# Patient Record
Sex: FEMALE | Race: WHITE | NOT HISPANIC OR LATINO | URBAN - METROPOLITAN AREA
[De-identification: names, ages, dates, MRNs, and addresses within clinical notes are randomized per-mention and may not be internally consistent; named-entity substitution may affect disease eponyms.]

---

## 2018-09-04 NOTE — PATIENT DISCUSSION
Discussed condition and exacerbating conditions/situations (e.g., dry/arid environments, overhead fans, air conditioners, side effect of medications). normal (ped)...

## 2020-01-16 ENCOUNTER — IMPORTED ENCOUNTER (OUTPATIENT)
Dept: URBAN - METROPOLITAN AREA CLINIC 50 | Facility: CLINIC | Age: 70
End: 2020-01-16

## 2020-02-24 ENCOUNTER — IMPORTED ENCOUNTER (OUTPATIENT)
Dept: URBAN - METROPOLITAN AREA CLINIC 50 | Facility: CLINIC | Age: 70
End: 2020-02-24

## 2020-02-26 ENCOUNTER — IMPORTED ENCOUNTER (OUTPATIENT)
Dept: URBAN - METROPOLITAN AREA CLINIC 50 | Facility: CLINIC | Age: 70
End: 2020-02-26

## 2020-03-02 ENCOUNTER — IMPORTED ENCOUNTER (OUTPATIENT)
Dept: URBAN - METROPOLITAN AREA CLINIC 50 | Facility: CLINIC | Age: 70
End: 2020-03-02

## 2020-03-03 ENCOUNTER — IMPORTED ENCOUNTER (OUTPATIENT)
Dept: URBAN - METROPOLITAN AREA CLINIC 50 | Facility: CLINIC | Age: 70
End: 2020-03-03

## 2020-11-20 ENCOUNTER — IMPORTED ENCOUNTER (OUTPATIENT)
Dept: URBAN - METROPOLITAN AREA CLINIC 50 | Facility: CLINIC | Age: 70
End: 2020-11-20

## 2021-04-17 ASSESSMENT — VISUAL ACUITY
OD_CC: 20/20
OD_CC: J1@ 16 IN
OS_CC: J1@ 16 IN
OD_BAT: 20/50
OS_BAT: 20/50
OS_CC: 20/25-2
OS_CC: +1.25
OS_OTHER: 20/50. 20/100.
OD_OTHER: 20/50. 20/100.

## 2021-04-17 ASSESSMENT — TONOMETRY
OD_IOP_MMHG: 14
OS_IOP_MMHG: 14

## 2021-06-01 ENCOUNTER — PREPPED CHART (OUTPATIENT)
Dept: URBAN - METROPOLITAN AREA CLINIC 53 | Facility: CLINIC | Age: 71
End: 2021-06-01

## 2021-06-02 ENCOUNTER — ROUTINE EXAM (OUTPATIENT)
Dept: URBAN - METROPOLITAN AREA CLINIC 53 | Facility: CLINIC | Age: 71
End: 2021-06-02

## 2021-06-02 DIAGNOSIS — H16.223: ICD-10-CM

## 2021-06-02 DIAGNOSIS — H25.813: ICD-10-CM

## 2021-06-02 DIAGNOSIS — H35.62: ICD-10-CM

## 2021-06-02 DIAGNOSIS — H40.1131: ICD-10-CM

## 2021-06-02 DIAGNOSIS — H47.233: ICD-10-CM

## 2021-06-02 DIAGNOSIS — H53.2: ICD-10-CM

## 2021-06-02 PROCEDURE — 92014 COMPRE OPH EXAM EST PT 1/>: CPT

## 2021-06-02 ASSESSMENT — KERATOMETRY
OD_K2POWER_DIOPTERS: 44.50
OD_AXISANGLE2_DEGREES: 121
OS_K1POWER_DIOPTERS: 45.50
OD_K1POWER_DIOPTERS: 45.50
OS_AXISANGLE_DEGREES: 165
OS_AXISANGLE2_DEGREES: 75
OS_K2POWER_DIOPTERS: 44.50
OD_AXISANGLE_DEGREES: 31

## 2021-06-02 ASSESSMENT — TONOMETRY
OS_IOP_MMHG: 15
OD_IOP_MMHG: 18

## 2021-06-02 ASSESSMENT — VISUAL ACUITY
OD_PH: 20/25
OD_CC: 20/30
OS_CC: 20/30
OU_CC: J1 @ 16 INCHES

## 2021-06-02 NOTE — PATIENT DISCUSSION
VISUALLY SIGNIFICANT: Informed patient that their cataract is visually significant and that surgery is recommended to improve patient's visual acuity and/or glare symptoms. RBAs of surgery discussed and questions answered. Patient to schedule biometry measurements and surgery. Recommend Laser.

## 2021-06-02 NOTE — PATIENT DISCUSSION
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 Will do iStent Inject at time of cataract surgery. HVF/OCT to be scheduled after cataract surgery.

## 2021-06-02 NOTE — PATIENT DISCUSSION
Discussion that if cataracts are causing diplopia surgery would fit, but if cataracts are not it could be muscle or systemic related. Will know more after having surgery.

## 2021-06-15 ENCOUNTER — BIOMETRY (OUTPATIENT)
Dept: URBAN - METROPOLITAN AREA CLINIC 53 | Facility: CLINIC | Age: 71
End: 2021-06-15

## 2021-06-15 DIAGNOSIS — H25.813: ICD-10-CM

## 2021-06-15 PROCEDURE — 92136 OPHTHALMIC BIOMETRY: CPT

## 2021-06-15 ASSESSMENT — KERATOMETRY
OS_AXISANGLE2_DEGREES: 75
OS_K1POWER_DIOPTERS: 45.50
OS_AXISANGLE_DEGREES: 165
OS_K2POWER_DIOPTERS: 44.50
OD_AXISANGLE_DEGREES: 30
OD_K2POWER_DIOPTERS: 44.50
OD_AXISANGLE2_DEGREES: 120
OD_K1POWER_DIOPTERS: 45.50

## 2021-06-15 NOTE — PATIENT DISCUSSION
PRE-OPERATIVE TESTING: Biometry and IOL calculations performed. Surgeon to review and select with patient at pre-op appointment. + MIGS.

## 2021-09-01 ENCOUNTER — PRE OP - CE/IOL OD (OUTPATIENT)
Dept: URBAN - METROPOLITAN AREA CLINIC 53 | Facility: CLINIC | Age: 71
End: 2021-09-01

## 2021-09-01 DIAGNOSIS — H35.62: ICD-10-CM

## 2021-09-01 DIAGNOSIS — H25.811: ICD-10-CM

## 2021-09-01 DIAGNOSIS — H40.1111: ICD-10-CM

## 2021-09-01 PROCEDURE — 92134 CPTRZ OPH DX IMG PST SGM RTA: CPT

## 2021-09-01 PROCEDURE — PREOP PRE OP VISIT

## 2021-09-01 ASSESSMENT — KERATOMETRY
OD_AXISANGLE2_DEGREES: 120
OD_K1POWER_DIOPTERS: 45.50
OS_AXISANGLE_DEGREES: 165
OS_AXISANGLE2_DEGREES: 75
OD_K2POWER_DIOPTERS: 44.50
OD_AXISANGLE_DEGREES: 30
OS_K2POWER_DIOPTERS: 44.50
OS_K1POWER_DIOPTERS: 45.50

## 2021-09-01 ASSESSMENT — VISUAL ACUITY
OS_PH: 20/40
OS_SC: 20/50-1
OD_SC: 20/60
OD_PH: 20/40

## 2021-09-01 ASSESSMENT — TONOMETRY
OD_IOP_MMHG: 16
OS_IOP_MMHG: 15

## 2021-09-09 ENCOUNTER — SURGERY/PROCEDURE (OUTPATIENT)
Dept: URBAN - METROPOLITAN AREA SURGERY 16 | Facility: SURGERY | Age: 71
End: 2021-09-09

## 2021-09-09 ENCOUNTER — SAME DAY PO - CE/IOL (OUTPATIENT)
Dept: URBAN - METROPOLITAN AREA CLINIC 48 | Facility: CLINIC | Age: 71
End: 2021-09-09

## 2021-09-09 DIAGNOSIS — Z96.1: ICD-10-CM

## 2021-09-09 DIAGNOSIS — H25.811: ICD-10-CM

## 2021-09-09 DIAGNOSIS — Z98.41: ICD-10-CM

## 2021-09-09 DIAGNOSIS — H40.1111: ICD-10-CM

## 2021-09-09 PROCEDURE — DIUXXFEMTO EYHANCE TORIC IOL WITH FEMTO

## 2021-09-09 PROCEDURE — 66984 XCAPSL CTRC RMVL W/O ECP: CPT

## 2021-09-09 PROCEDURE — 99024 POSTOP FOLLOW-UP VISIT: CPT

## 2021-09-09 PROCEDURE — 0191T ISTENT INJECT MICROSTENT: CPT

## 2021-09-09 ASSESSMENT — TONOMETRY: OD_IOP_MMHG: 28

## 2021-09-09 ASSESSMENT — KERATOMETRY
OS_K1POWER_DIOPTERS: 45.50
OS_AXISANGLE2_DEGREES: 75
OD_K2POWER_DIOPTERS: 44.50
OD_AXISANGLE2_DEGREES: 120
OD_AXISANGLE_DEGREES: 30
OD_K1POWER_DIOPTERS: 45.50
OS_AXISANGLE2_DEGREES: 75
OD_K2POWER_DIOPTERS: 44.50
OD_AXISANGLE_DEGREES: 30
OS_K1POWER_DIOPTERS: 45.50
OD_K1POWER_DIOPTERS: 45.50
OD_AXISANGLE2_DEGREES: 120
OS_AXISANGLE_DEGREES: 165
OS_K2POWER_DIOPTERS: 44.50
OS_AXISANGLE_DEGREES: 165
OS_K2POWER_DIOPTERS: 44.50

## 2021-09-09 ASSESSMENT — VISUAL ACUITY: OD_SC: 20/70

## 2021-09-09 NOTE — PATIENT DISCUSSION
CATARACT SURGERY PLANNER - TORIC IOL/+FEMTO/MIGS: Phacoemulsification with IOL: Eye: OD|DOS: 9/9/2021|Model: Francella Hole: 21.5(ORA)|Target: PLANO|Femto: YES|Axis: 35°|MIGS: STENT INJECT|Visc: DUET|Omidria: YES|10% Phenylephrine: YES|Epi-shugarcaine: YES|Phaco Setting: STD|BSS+: NO|Trypan Blue: NO|CTR:NO|Olive Tip: +/-|Atropine: NO|Pupilloplasty: NO|Notes: HX. MILD POAG OU; POSSIBLE INTERMITTENT DIPLOPIA OU. DILATED PUPIL: 7MM. ***REQUESTS 10 AM ARRIVAL***.

## 2021-09-09 NOTE — PATIENT DISCUSSION
Long discussion on double vision. Patient understands that cataract surgery will not fix double VA if is coming from misalignment. Patient aware of possible need for prism glasses despite IOL implanted.

## 2021-09-15 ENCOUNTER — PRE OP - CE/IOL OS / 1 WEEK PO OD (OUTPATIENT)
Dept: URBAN - METROPOLITAN AREA CLINIC 53 | Facility: CLINIC | Age: 71
End: 2021-09-15

## 2021-09-15 DIAGNOSIS — H25.812: ICD-10-CM

## 2021-09-15 DIAGNOSIS — Z98.41: ICD-10-CM

## 2021-09-15 DIAGNOSIS — H40.1121: ICD-10-CM

## 2021-09-15 PROCEDURE — PREOP PRE OP VISIT

## 2021-09-15 PROCEDURE — 92136 - 2N OPHTHALMIC BIOMETRY BY PARTIAL COHERENCE INTERFEROMETRY WITH INTRAOCULAR LENS POWER CALCULATION

## 2021-09-15 ASSESSMENT — KERATOMETRY
OD_K2POWER_DIOPTERS: 44.50
OD_K1POWER_DIOPTERS: 45.50
OS_K1POWER_DIOPTERS: 45.50
OS_AXISANGLE_DEGREES: 165
OD_AXISANGLE_DEGREES: 30
OS_AXISANGLE2_DEGREES: 75
OD_AXISANGLE2_DEGREES: 120
OS_K2POWER_DIOPTERS: 44.50

## 2021-09-15 ASSESSMENT — VISUAL ACUITY
OD_SC: J3-
OD_PH: 20/25
OD_SC: 20/30
OS_SC: 20/30

## 2021-09-15 ASSESSMENT — TONOMETRY
OD_IOP_MMHG: 15
OS_IOP_MMHG: 15

## 2021-09-15 NOTE — PATIENT DISCUSSION
CATARACT SURGERY PLANNER - TORIC IOL/+FEMTO/MIGS: Phacoemulsification with IOL: Eye: OS|DOS: 9/23/2021|Model: [id number]|Power: 20. 5|Target: PLANO|Femto: YES|Axis: 160°|MIGS: ISTENT INJECT|Visc: DUET|Omidria: YES|10% Phenylephrine: YES|Epi-shugarcaine: YES|Phaco Setting: STD|BSS+: NO|Trypan Blue: NO|CTR: NO|Olive Tip: +/-|Atropine: NO|Pupilloplasty: NO|Notes: Plan: Eyhance Toric w/Femto Target PLANO OU; iStent Inject OU. Hx: Mild POAG OU; Questionable Intermittent Diplopia . DILATES to 7mm.

## 2021-09-23 ENCOUNTER — SAME DAY PO - CE/IOL (OUTPATIENT)
Dept: URBAN - METROPOLITAN AREA CLINIC 48 | Facility: CLINIC | Age: 71
End: 2021-09-23

## 2021-09-23 ENCOUNTER — SURGERY/PROCEDURE (OUTPATIENT)
Dept: URBAN - METROPOLITAN AREA SURGERY 16 | Facility: SURGERY | Age: 71
End: 2021-09-23

## 2021-09-23 DIAGNOSIS — H40.1121: ICD-10-CM

## 2021-09-23 DIAGNOSIS — Z98.42: ICD-10-CM

## 2021-09-23 DIAGNOSIS — Z96.1: ICD-10-CM

## 2021-09-23 DIAGNOSIS — H25.812: ICD-10-CM

## 2021-09-23 PROCEDURE — 66984 XCAPSL CTRC RMVL W/O ECP: CPT

## 2021-09-23 PROCEDURE — 66174 TRLUML DIL AQ O/F CAN W/O ST: CPT

## 2021-09-23 PROCEDURE — 0191T ISTENT INJECT MICROSTENT: CPT

## 2021-09-23 PROCEDURE — 99024 POSTOP FOLLOW-UP VISIT: CPT

## 2021-09-23 PROCEDURE — DIUXXFEMTO EYHANCE TORIC IOL WITH FEMTO

## 2021-09-23 ASSESSMENT — KERATOMETRY
OD_AXISANGLE2_DEGREES: 120
OD_K1POWER_DIOPTERS: 45.50
OS_AXISANGLE_DEGREES: 165
OS_AXISANGLE_DEGREES: 165
OS_AXISANGLE2_DEGREES: 75
OS_K2POWER_DIOPTERS: 44.50
OS_K1POWER_DIOPTERS: 45.50
OD_AXISANGLE_DEGREES: 30
OS_K1POWER_DIOPTERS: 45.50
OD_AXISANGLE2_DEGREES: 120
OD_K2POWER_DIOPTERS: 44.50
OS_AXISANGLE2_DEGREES: 75
OD_AXISANGLE_DEGREES: 30
OD_K2POWER_DIOPTERS: 44.50
OD_K1POWER_DIOPTERS: 45.50
OS_K2POWER_DIOPTERS: 44.50

## 2021-09-23 ASSESSMENT — TONOMETRY: OS_IOP_MMHG: 20

## 2021-09-23 ASSESSMENT — VISUAL ACUITY: OS_SC: 20/70

## 2021-09-23 NOTE — PATIENT DISCUSSION
CATARACT SURGERY PLANNER - TORIC IOL/+FEMTO/MIGS: Phacoemulsification with IOL: Eye: OS|DOS: 9/23/2021|Model: [id number]|Power: 20. 5|Target: PLANO|Femto: YES|Axis: 160°|MIGS: ISTENT INJECT|Visc: DUET|Omidria: YES|10% Phenylephrine: YES|Epi-shugarcaine: YES|Phaco Setting: STD|BSS+: NO|Trypan Blue: NO|CTR: NO|Olive Tip: +/-|Atropine: NO|Pupilloplasty: NO|Notes: Plan: Eyhance Toric w/Femto Target PLANO OU; iStent Inject OU. Hx: Mild POAG OU; Questionable Intermittent Diplopia . DILATES to 7mm.

## 2021-09-29 ENCOUNTER — 1 WEEK PO - CE/IOL (OUTPATIENT)
Dept: URBAN - METROPOLITAN AREA CLINIC 53 | Facility: CLINIC | Age: 71
End: 2021-09-29

## 2021-09-29 DIAGNOSIS — Z98.42: ICD-10-CM

## 2021-09-29 DIAGNOSIS — Z96.1: ICD-10-CM

## 2021-09-29 PROCEDURE — 99024 POSTOP FOLLOW-UP VISIT: CPT

## 2021-09-29 ASSESSMENT — VISUAL ACUITY
OD_SC: J3
OD_SC: 20/30
OS_SC: 20/25
OS_SC: J1+
OS_SC: 20/20
OD_SC: 20/30

## 2021-09-29 ASSESSMENT — TONOMETRY
OD_IOP_MMHG: 12
OS_IOP_MMHG: 13

## 2021-10-20 ENCOUNTER — 4 WEEK PO - CE/IOL (OUTPATIENT)
Dept: URBAN - METROPOLITAN AREA CLINIC 53 | Facility: CLINIC | Age: 71
End: 2021-10-20

## 2021-10-20 DIAGNOSIS — H43.812: ICD-10-CM

## 2021-10-20 DIAGNOSIS — Z96.1: ICD-10-CM

## 2021-10-20 PROCEDURE — 99024 POSTOP FOLLOW-UP VISIT: CPT

## 2021-10-20 PROCEDURE — 92134 CPTRZ OPH DX IMG PST SGM RTA: CPT

## 2021-10-20 ASSESSMENT — VISUAL ACUITY
OS_GLARE: 20/40
OD_SC: 20/30
OS_SC: 20/30-2
OD_PH: 20/25
OS_GLARE: 20/30
OU_SC: J1
OD_GLARE: 20/25
OD_GLARE: 20/30

## 2021-10-20 ASSESSMENT — TONOMETRY
OS_IOP_MMHG: 14
OD_IOP_MMHG: 13

## 2021-10-20 NOTE — PATIENT DISCUSSION
Discussed with patient scheduling a Motility exam at her convenience to address the diplopia. Holding off on glasses rx at this time.

## 2021-10-20 NOTE — PATIENT DISCUSSION
Recommended PF AT's OU 4-6x a day. Start Systane PM ointment OU right before bed. Start Warm Compresses OU BID. Start Lid Scrubs OU BID.

## 2021-10-20 NOTE — PATIENT DISCUSSION
Patient understands that cataract surgery will not fix double VA if is coming from misalignment. Patient aware of possible need for prism glasses despite IOL implanted.

## 2021-10-20 NOTE — PATIENT DISCUSSION
Discussed with patient possibly starting a rx for dryness if the current regiment does not help improve symptoms within a couple of weeks.

## 2021-11-11 ENCOUNTER — MOTILITY CHECK (OUTPATIENT)
Dept: URBAN - METROPOLITAN AREA CLINIC 53 | Facility: CLINIC | Age: 71
End: 2021-11-11

## 2021-11-11 DIAGNOSIS — H53.2: ICD-10-CM

## 2021-11-11 PROCEDURE — 92015NC REFRACTION NO CHARGE

## 2021-11-11 PROCEDURE — 92060 SENSORIMOTOR EXAMINATION: CPT

## 2021-11-11 ASSESSMENT — VISUAL ACUITY
OU_SC: J5
OD_SC: 20/30
OU_SC: 20/25
OS_SC: 20/30

## 2022-01-26 ENCOUNTER — FOLLOW UP (OUTPATIENT)
Dept: URBAN - METROPOLITAN AREA CLINIC 53 | Facility: CLINIC | Age: 72
End: 2022-01-26

## 2022-01-26 DIAGNOSIS — H43.812: ICD-10-CM

## 2022-01-26 DIAGNOSIS — D23.121: ICD-10-CM

## 2022-01-26 DIAGNOSIS — H40.1131: ICD-10-CM

## 2022-01-26 DIAGNOSIS — H16.223: ICD-10-CM

## 2022-01-26 DIAGNOSIS — H26.493: ICD-10-CM

## 2022-01-26 PROCEDURE — 76514 ECHO EXAM OF EYE THICKNESS: CPT

## 2022-01-26 PROCEDURE — 92014 COMPRE OPH EXAM EST PT 1/>: CPT

## 2022-01-26 ASSESSMENT — VISUAL ACUITY
OD_CC: 20/25
OU_CC: J1+@16INCHES
OS_PH: 20/25-1
OS_SC: 20/30 PUSH
OS_CC: 20/25
OS_CC: J1+@16INCHES
OD_CC: J1+@16INCHES
OD_PH: 20/25
OD_SC: 20/30
OU_SC: J1+@16INCHES

## 2022-01-26 ASSESSMENT — PACHYMETRY
OS_CT_UM: 567
OD_CT_UM: 573

## 2022-01-26 ASSESSMENT — KERATOMETRY
OS_K1POWER_DIOPTERS: 45.25
OD_AXISANGLE_DEGREES: 131
OD_K1POWER_DIOPTERS: 44.75
OD_K2POWER_DIOPTERS: 45.50
OS_AXISANGLE2_DEGREES: 145
OD_AXISANGLE2_DEGREES: 41
OS_AXISANGLE_DEGREES: 55
OS_K2POWER_DIOPTERS: 45.50

## 2022-01-26 ASSESSMENT — TONOMETRY
OD_IOP_MMHG: 14
OS_IOP_MMHG: 16
OS_IOP_MMHG: 15
OD_IOP_MMHG: 16

## 2022-03-11 ENCOUNTER — ESTABLISHED PATIENT (OUTPATIENT)
Dept: URBAN - METROPOLITAN AREA CLINIC 50 | Facility: CLINIC | Age: 72
End: 2022-03-11

## 2022-03-11 DIAGNOSIS — H20.012: ICD-10-CM

## 2022-03-11 DIAGNOSIS — H11.442: ICD-10-CM

## 2022-03-11 PROCEDURE — 92012 INTRM OPH EXAM EST PATIENT: CPT

## 2022-03-11 RX ORDER — TOBRAMYCIN AND DEXAMETHASONE 1; 3 MG/ML; MG/ML
1 SUSPENSION/ DROPS OPHTHALMIC
Start: 2022-03-11 | End: 2022-03-16

## 2022-03-11 ASSESSMENT — KERATOMETRY
OS_K2POWER_DIOPTERS: 45.50
OS_AXISANGLE2_DEGREES: 145
OD_AXISANGLE_DEGREES: 131
OD_AXISANGLE2_DEGREES: 41
OS_K1POWER_DIOPTERS: 45.25
OS_AXISANGLE_DEGREES: 55
OD_K2POWER_DIOPTERS: 45.50
OD_K1POWER_DIOPTERS: 44.75

## 2022-03-11 ASSESSMENT — VISUAL ACUITY
OS_PH: 20/25
OS_CC: 20/30
OU_CC: 20/25
OD_CC: 20/25

## 2022-03-11 ASSESSMENT — TONOMETRY
OD_IOP_MMHG: 13
OS_IOP_MMHG: 12
OD_IOP_MMHG: 11
OS_IOP_MMHG: 13

## 2022-06-16 ENCOUNTER — DIAGNOSTICS ONLY (OUTPATIENT)
Dept: URBAN - METROPOLITAN AREA CLINIC 53 | Facility: CLINIC | Age: 72
End: 2022-06-16

## 2022-06-16 DIAGNOSIS — H40.1131: ICD-10-CM

## 2022-06-16 PROCEDURE — 92083 EXTENDED VISUAL FIELD XM: CPT

## 2022-06-16 PROCEDURE — 92133 CPTRZD OPH DX IMG PST SGM ON: CPT

## 2022-06-16 ASSESSMENT — KERATOMETRY
OS_K1POWER_DIOPTERS: 45.25
OS_AXISANGLE2_DEGREES: 145
OS_AXISANGLE_DEGREES: 55
OD_AXISANGLE_DEGREES: 131
OD_AXISANGLE2_DEGREES: 41
OS_K2POWER_DIOPTERS: 45.50
OD_K2POWER_DIOPTERS: 45.50
OD_K1POWER_DIOPTERS: 44.75

## 2022-06-16 NOTE — PATIENT DISCUSSION
PCO (NO TX): PCO is not visually significant. Educated patient on signs and symptoms of PCO. Continue to monitor at this time. Patient

## 2022-06-22 ENCOUNTER — ESTABLISHED PATIENT (OUTPATIENT)
Dept: URBAN - METROPOLITAN AREA CLINIC 53 | Facility: CLINIC | Age: 72
End: 2022-06-22

## 2022-06-22 DIAGNOSIS — H40.1131: ICD-10-CM

## 2022-06-22 PROCEDURE — 92012 INTRM OPH EXAM EST PATIENT: CPT

## 2022-06-22 ASSESSMENT — TONOMETRY
OD_IOP_MMHG: 12
OS_IOP_MMHG: 15
OS_IOP_MMHG: 16
OD_IOP_MMHG: 14

## 2022-06-22 ASSESSMENT — VISUAL ACUITY
OS_CC: 20/30
OD_CC: 20/20

## 2022-12-13 ENCOUNTER — COMPREHENSIVE EXAM (OUTPATIENT)
Dept: URBAN - METROPOLITAN AREA CLINIC 53 | Facility: CLINIC | Age: 72
End: 2022-12-13

## 2022-12-13 PROCEDURE — 92015 DETERMINE REFRACTIVE STATE: CPT

## 2022-12-13 PROCEDURE — 92014 COMPRE OPH EXAM EST PT 1/>: CPT

## 2022-12-13 ASSESSMENT — KERATOMETRY
OS_AXISANGLE2_DEGREES: 160
OD_K2POWER_DIOPTERS: 45.50
OD_AXISANGLE_DEGREES: 121
OS_K2POWER_DIOPTERS: 45.50
OS_AXISANGLE_DEGREES: 70
OS_K1POWER_DIOPTERS: 44.75
OD_AXISANGLE2_DEGREES: 31
OD_K1POWER_DIOPTERS: 44.50

## 2022-12-13 ASSESSMENT — VISUAL ACUITY
OU_CC: J1+
OD_GLARE: 20/25
OD_SC: 20/40
OS_SC: 20/30
OD_CC: 20/25
OS_GLARE: 20/25
OD_PH: 20/25
OD_GLARE: 20/30
OS_CC: 20/25
OU_CC: 20/25
OS_PH: 20/20

## 2022-12-13 ASSESSMENT — TONOMETRY
OS_IOP_MMHG: 13
OD_IOP_MMHG: 12
OD_IOP_MMHG: 14
OS_IOP_MMHG: 14

## 2022-12-13 NOTE — PATIENT DISCUSSION
IOP today 14/14 and adjusted to 12/13. RTC in 6 months for HVF/RNFL/IOP check. Continue latanoprost QHS OU. Continue timolol QAM OS.

## 2023-06-20 ENCOUNTER — FOLLOW UP (OUTPATIENT)
Dept: URBAN - METROPOLITAN AREA CLINIC 53 | Facility: CLINIC | Age: 73
End: 2023-06-20

## 2023-06-20 DIAGNOSIS — H40.1131: ICD-10-CM

## 2023-06-20 PROCEDURE — 92083 EXTENDED VISUAL FIELD XM: CPT

## 2023-06-20 PROCEDURE — 92133 CPTRZD OPH DX IMG PST SGM ON: CPT

## 2023-06-20 PROCEDURE — 92012 INTRM OPH EXAM EST PATIENT: CPT

## 2023-06-20 ASSESSMENT — TONOMETRY
OD_IOP_MMHG: 12
OD_IOP_MMHG: 10
OS_IOP_MMHG: 11
OS_IOP_MMHG: 12

## 2023-06-20 ASSESSMENT — VISUAL ACUITY
OS_CC: 20/25
OD_CC: 20/25

## 2023-06-20 ASSESSMENT — KERATOMETRY
OS_K1POWER_DIOPTERS: 44.75
OS_AXISANGLE2_DEGREES: 160
OD_K1POWER_DIOPTERS: 44.50
OS_AXISANGLE_DEGREES: 70
OD_AXISANGLE2_DEGREES: 31
OD_AXISANGLE_DEGREES: 121
OS_K2POWER_DIOPTERS: 45.50
OD_K2POWER_DIOPTERS: 45.50

## 2023-12-22 NOTE — PATIENT DISCUSSION
Addended by: NIKI BUENROSTRO on: 12/22/2023 03:31 PM     Modules accepted: Orders     Patient given Rx for glasses.

## 2024-01-03 ENCOUNTER — COMPREHENSIVE EXAM (OUTPATIENT)
Dept: URBAN - METROPOLITAN AREA CLINIC 53 | Facility: CLINIC | Age: 74
End: 2024-01-03

## 2024-01-03 DIAGNOSIS — H53.2: ICD-10-CM

## 2024-01-03 DIAGNOSIS — H11.152: ICD-10-CM

## 2024-01-03 DIAGNOSIS — Z01.01: ICD-10-CM

## 2024-01-03 DIAGNOSIS — H43.812: ICD-10-CM

## 2024-01-03 DIAGNOSIS — H52.4: ICD-10-CM

## 2024-01-03 DIAGNOSIS — H16.223: ICD-10-CM

## 2024-01-03 DIAGNOSIS — H40.1131: ICD-10-CM

## 2024-01-03 DIAGNOSIS — H26.493: ICD-10-CM

## 2024-01-03 PROCEDURE — 92015 DETERMINE REFRACTIVE STATE: CPT

## 2024-01-03 PROCEDURE — 92014 COMPRE OPH EXAM EST PT 1/>: CPT

## 2024-01-03 ASSESSMENT — VISUAL ACUITY
OU_CC: 20/30
OD_GLARE: 20/50
OS_GLARE: 20/60
OS_CC: 20/40-1
OD_CC: 20/30
OU_CC: J1+

## 2024-01-03 ASSESSMENT — KERATOMETRY
OD_AXISANGLE_DEGREES: 121
OS_K2POWER_DIOPTERS: 45.50
OS_AXISANGLE_DEGREES: 70
OS_K1POWER_DIOPTERS: 44.75
OD_K2POWER_DIOPTERS: 45.50
OD_K1POWER_DIOPTERS: 44.50
OD_AXISANGLE2_DEGREES: 31
OS_AXISANGLE2_DEGREES: 160

## 2024-01-03 ASSESSMENT — TONOMETRY
OS_IOP_MMHG: 15
OS_IOP_MMHG: 14
OD_IOP_MMHG: 14
OD_IOP_MMHG: 16

## 2024-02-20 NOTE — PATIENT DISCUSSION
CATARACT SURGERY PLANNER - TORIC IOL/+FEMTO/MIGS: Phacoemulsification with IOL: Eye: OD|DOS: 9/9/2021|Model: Gee Edge: 21.5(ORA)|Target: PLANO|Femto: YES|Axis: 35°|MIGS: STENT INJECT|Visc: DUET|Omidria: YES|10% Phenylephrine: YES|Epi-shugarcaine: YES|Phaco Setting: STD|BSS+: NO|Trypan Blue: NO|CTR:NO|Olive Tip: +/-|Atropine: NO|Pupilloplasty: NO|Notes: HX. MILD POAG OU; POSSIBLE INTERMITTENT DIPLOPIA OU. DILATED PUPIL: 7MM. ***REQUESTS 10 AM ARRIVAL***.
Consented for iStent Inject OD.
Continue AF tears PRN.
Continue current management.
ERG FF OU after cataract surgery.
LENS OPTION (SUPERIOR): Discussed with patient in detail all available methods and lens options as well as their associated benefits, limitations and out-of-pocket costs. Patient chooses femtosecond laser-assisted cataract surgery with toric monofocal lens implant and understands that reading glasses will still be needed after surgery. The patient also understands that with any IOL there is no guarantee that they will not require glasses to see their best at any distance after surgery. The risks, benefits and alternatives to surgery were explained and all questions were answered.
Long discussion on double vision. Patient understands that cataract surgery will not fix double VA if is coming from misalignment. Patient aware of possible need for prism glasses despite IOL implanted.
Splinter heme @ 5:30. Will monitor.
No

## 2024-07-30 ENCOUNTER — FOLLOW UP (OUTPATIENT)
Dept: URBAN - METROPOLITAN AREA CLINIC 53 | Facility: CLINIC | Age: 74
End: 2024-07-30

## 2024-07-30 DIAGNOSIS — H40.1131: ICD-10-CM

## 2024-07-30 PROCEDURE — 92083 EXTENDED VISUAL FIELD XM: CPT

## 2024-07-30 PROCEDURE — 99213 OFFICE O/P EST LOW 20 MIN: CPT

## 2024-07-30 PROCEDURE — 92133 CPTRZD OPH DX IMG PST SGM ON: CPT

## 2024-07-30 ASSESSMENT — KERATOMETRY
OD_K1POWER_DIOPTERS: 44.50
OD_AXISANGLE2_DEGREES: 31
OS_AXISANGLE_DEGREES: 70
OS_K2POWER_DIOPTERS: 45.50
OD_AXISANGLE_DEGREES: 121
OS_AXISANGLE2_DEGREES: 160
OS_K1POWER_DIOPTERS: 44.75
OD_K2POWER_DIOPTERS: 45.50

## 2024-07-30 ASSESSMENT — TONOMETRY
OD_IOP_MMHG: 10
OS_IOP_MMHG: 12
OD_IOP_MMHG: 12
OS_IOP_MMHG: 11

## 2024-07-30 ASSESSMENT — VISUAL ACUITY
OS_CC: 20/30
OU_CC: 20/20
OD_CC: 20/25-2

## 2025-01-13 ENCOUNTER — COMPREHENSIVE EXAM (OUTPATIENT)
Age: 75
End: 2025-01-13

## 2025-01-13 DIAGNOSIS — D23.121: ICD-10-CM

## 2025-01-13 DIAGNOSIS — Z01.01: ICD-10-CM

## 2025-01-13 DIAGNOSIS — H52.4: ICD-10-CM

## 2025-01-13 DIAGNOSIS — H40.1131: ICD-10-CM

## 2025-01-13 PROCEDURE — 92015 DETERMINE REFRACTIVE STATE: CPT

## 2025-01-13 PROCEDURE — 92014 COMPRE OPH EXAM EST PT 1/>: CPT

## 2025-06-08 NOTE — PATIENT DISCUSSION
Continue current management.
Discussed with patient possibly starting a rx for dryness if the current regiment does not help improve symptoms within a couple of weeks.
ERG FF OU after cataract surgery.
IOP controlled on currenet drop therapy.
No prism is glasses. Not currently experiencing double vision.
PCO (NO TX): PCO is not visually significant. Educated patient on signs and symptoms of PCO. Continue to monitor at this time.
Patient doing well post-operatively.
Patient given Rx for glasses.
Patient to continue all future care with an Optom.
Recommended PF AT's OU 4-6x a day. Start Systane PM ointment OU right before bed. Start Warm Compresses OU BID. Start Lid Scrubs OU BID.
Recommended observation.
Retinal tear and detachment warning symptoms reviewed and patient instructed to call immediately if increasing floaters, flashes, or decreasing peripheral vision.
Splinter Heme OS resolved.
Splinter heme @ 5:30. Will monitor.
Testing reviewed and WNL.
discussed with patient.   instructed patient to start tobradex 3 times a day for 5 days then stop.
s/p iStent Inject with Canaloplasty OU.
tobradex 3 times a day for 5 days.  then stop.
Patient lives with spouse in private home with 3 steps to enter, 1 flight of stairs inside. PTA, pt was independent with all mobility without use of assistive device.

## 2025-07-25 ENCOUNTER — DIAGNOSTICS ONLY (OUTPATIENT)
Age: 75
End: 2025-07-25

## 2025-07-25 DIAGNOSIS — H40.1131: ICD-10-CM

## 2025-07-25 PROCEDURE — 92133 CPTRZD OPH DX IMG PST SGM ON: CPT

## 2025-07-25 PROCEDURE — 92083 EXTENDED VISUAL FIELD XM: CPT

## 2025-08-12 ENCOUNTER — FOLLOW UP (OUTPATIENT)
Age: 75
End: 2025-08-12

## 2025-08-12 DIAGNOSIS — H40.1131: ICD-10-CM

## 2025-08-12 PROCEDURE — 99213 OFFICE O/P EST LOW 20 MIN: CPT
